# Patient Record
Sex: FEMALE | Race: WHITE
[De-identification: names, ages, dates, MRNs, and addresses within clinical notes are randomized per-mention and may not be internally consistent; named-entity substitution may affect disease eponyms.]

---

## 2017-09-27 ENCOUNTER — HOSPITAL ENCOUNTER (EMERGENCY)
Dept: HOSPITAL 25 - UCCORT | Age: 22
Discharge: HOME | End: 2017-09-27
Payer: COMMERCIAL

## 2017-09-27 VITALS — SYSTOLIC BLOOD PRESSURE: 127 MMHG | DIASTOLIC BLOOD PRESSURE: 75 MMHG

## 2017-09-27 DIAGNOSIS — E66.9: ICD-10-CM

## 2017-09-27 DIAGNOSIS — J02.9: Primary | ICD-10-CM

## 2017-09-27 PROCEDURE — 87651 STREP A DNA AMP PROBE: CPT

## 2017-09-27 PROCEDURE — G0463 HOSPITAL OUTPT CLINIC VISIT: HCPCS

## 2017-09-27 PROCEDURE — 99211 OFF/OP EST MAY X REQ PHY/QHP: CPT

## 2017-09-27 NOTE — UC
Throat Pain/Nasal Trae HPI





- HPI Summary


HPI Summary: 





sore throat since yesterday. 





- History of Current Complaint


Chief Complaint: UCRespiratory


Stated Complaint: SORE THROAT EARS


Time Seen by Provider: 09/27/17 09:01


Hx Obtained From: Patient


Hx Last Menstrual Period: 9/25/17


Pregnant?: No


Onset/Duration: Gradual Onset, Lasting Hours


Severity: Moderate


Cough: None


Associated Signs & Symptoms: Positive: Dysphagia.  Negative: FB Sensation, 

Drooling, Wheezing, Hoarseness, Sinus Discomfort, Nasal Discharge, Fever, 

Vomiting, Rash





- Epiglottits Risk Factors


Epiglottis Risk Factors: Negative





- Allergies/Home Medications


Allergies/Adverse Reactions: 


 Allergies











Allergy/AdvReac Type Severity Reaction Status Date / Time


 


No Known Allergies Allergy   Verified 09/27/17 08:49











Home Medications: 


 Home Medications





Aspirin-Acetaminophen-Caffeine [Excedrin Migraine] 1 tab PO ONCE PRN 09/27/17 [

History Confirmed 09/27/17]


Phenol 1.4% Spray* [Chloroseptic Throat Spray*] 1 spray MT Q2H PRN 09/27/17 [

History Confirmed 09/27/17]











PMH/Surg Hx/FS Hx/Imm Hx


Previously Healthy: Yes





- Surgical History


Surgical History: None


Surgery Procedure, Year, and Place: denies





- Family History


Known Family History: Positive: None





- Social History


Alcohol Use: Weekly


Substance Use Type: None


Smoking Status (MU): Never Smoked Tobacco





Review of Systems


ENT: Sore Throat


All Other Systems Reviewed And Are Negative: Yes





Physical Exam


Triage Information Reviewed: Yes


Appearance: Well-Appearing, Well-Nourished, Obese


Vital Signs: 


 Initial Vital Signs











Temp  98.3 F   09/27/17 08:53


 


Pulse  116   09/27/17 08:53


 


Resp  18   09/27/17 08:53


 


BP  127/75   09/27/17 08:53


 


Pulse Ox  99   09/27/17 08:53











Vital Signs Reviewed: Yes


Eyes: Positive: Conjunctiva Clear


ENT: Positive: Pharyngeal erythema, Nasal drainage, TMs normal.  Negative: 

Nasal congestion, Tonsillar swelling, Tonsillar exudate, Trismus


Neck exam: Normal


Neck: Positive: Supple, Nontender, Enlarged Nodes @ - left submandibular 

swelling without redness or tenderness.


Respiratory: Positive: Lungs clear, Normal breath sounds, No respiratory 

distress, No accessory muscle use.  Negative: Respiratory distress, Crackles, 

Rhonchi, Stridor, Wheezing, Expiration, Inspiration


Cardiovascular Exam: Normal


Cardiovascular: Positive: RRR, No Murmur, Pulses Normal, Brisk Capillary Refill


Abdomen Description: Positive: Nontender, No Organomegaly, Soft


Musculoskeletal: Positive: Strength Intact, ROM Intact, No Edema


Neurological: Positive: Alert, Muscle Tone Normal.  Negative: Fatigued


Skin: Negative: rashes





Throat Pain/Nasal Course/Dx





- Course


Course Of Treatment: no exudate, no fever. strep neg.





- Differential Dx/Diagnosis


Provider Diagnoses: viral pharyngitis.





Discharge





- Discharge Plan


Condition: Good


Disposition: HOME


Patient Education Materials:  Upper Respiratory Infection (ED), Pharyngitis (ED)


Referrals: 


Non Staff,Doctor [Primary Care Provider] -

## 2017-11-20 ENCOUNTER — RX RENEWAL (OUTPATIENT)
Age: 22
End: 2017-11-20

## 2017-12-26 ENCOUNTER — APPOINTMENT (OUTPATIENT)
Dept: OBGYN | Facility: CLINIC | Age: 22
End: 2017-12-26
Payer: COMMERCIAL

## 2017-12-26 VITALS
DIASTOLIC BLOOD PRESSURE: 79 MMHG | BODY MASS INDEX: 30.63 KG/M2 | SYSTOLIC BLOOD PRESSURE: 109 MMHG | HEIGHT: 60 IN | WEIGHT: 156 LBS

## 2017-12-26 DIAGNOSIS — Z01.419 ENCOUNTER FOR GYNECOLOGICAL EXAMINATION (GENERAL) (ROUTINE) W/OUT ABNORMAL FINDINGS: ICD-10-CM

## 2017-12-26 DIAGNOSIS — L70.9 ACNE, UNSPECIFIED: ICD-10-CM

## 2017-12-26 DIAGNOSIS — Z30.41 ENCOUNTER FOR SURVEILLANCE OF CONTRACEPTIVE PILLS: ICD-10-CM

## 2017-12-26 PROCEDURE — 99395 PREV VISIT EST AGE 18-39: CPT

## 2017-12-28 LAB
C TRACH RRNA SPEC QL NAA+PROBE: NOT DETECTED
N GONORRHOEA RRNA SPEC QL NAA+PROBE: NOT DETECTED
SOURCE TP AMPLIFICATION: NORMAL

## 2017-12-29 LAB — CYTOLOGY CVX/VAG DOC THIN PREP: NORMAL

## 2018-08-11 ENCOUNTER — HOSPITAL ENCOUNTER (EMERGENCY)
Dept: HOSPITAL 25 - UCCORT | Age: 23
Discharge: HOME | End: 2018-08-11
Payer: COMMERCIAL

## 2018-08-11 VITALS — DIASTOLIC BLOOD PRESSURE: 84 MMHG | SYSTOLIC BLOOD PRESSURE: 136 MMHG

## 2018-08-11 DIAGNOSIS — S80.862A: Primary | ICD-10-CM
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
## 2018-08-11 DIAGNOSIS — S80.861A: ICD-10-CM
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
## 2018-08-11 PROCEDURE — G0463 HOSPITAL OUTPT CLINIC VISIT: HCPCS

## 2018-08-11 PROCEDURE — 99212 OFFICE O/P EST SF 10 MIN: CPT

## 2018-08-11 NOTE — UC
Skin Complaint HPI





- HPI Summary


HPI Summary: 





pt notes several bites to her lower legs and feet over the past 2 weeks. her 

office at work is carpeted. she did find a tiny black insect on her but it 

sprung away when she went to show a coworker. no one at home has the rash. it 

is severely itchy. she is bombing the office today.





- History of Current Complaint


Chief Complaint: UCSkin


Time Seen by Provider: 08/11/18 13:45


Stated Complaint: SKIN COMPLAINT


Hx Obtained From: Patient


Hx Last Menstrual Period: 7/30/18


Onset/Duration: Gradual Onset


Timing: Constant


Pain Intensity: 0


Alleviating Factor(s): Nothing





- Allergy/Home Medications


Allergies/Adverse Reactions: 


 Allergies











Allergy/AdvReac Type Severity Reaction Status Date / Time


 


No Known Allergies Allergy   Verified 08/11/18 13:25














Review of Systems


Constitutional: Negative


Skin: Rash


Eyes: Negative


ENT: Negative


Respiratory: Negative


Cardiovascular: Negative


Gastrointestinal: Negative


Genitourinary: Negative


Motor: Negative


Neurovascular: Negative


Musculoskeletal: Negative


Neurological: Negative


Psychological: Negative


Is Patient Immunocompromised?: No


All Other Systems Reviewed And Are Negative: Yes





PMH/Surg Hx/FS Hx/Imm Hx


Previously Healthy: Yes





- Surgical History


Surgical History: None


Surgery Procedure, Year, and Place: denies





- Family History


Known Family History: Positive: None





- Social History


Occupation: Employed Full-time


Lives: With Family


Alcohol Use: Occasionally


Substance Use Type: None


Smoking Status (MU): Never Smoked Tobacco





- Immunization History


Vaccination Up to Date: Yes





Physical Exam


Triage Information Reviewed: Yes


Appearance: Well-Appearing


Vital Signs: 


 Initial Vital Signs











Temp  99.0 F   08/11/18 13:22


 


Pulse  96   08/11/18 13:22


 


Resp  15   08/11/18 13:22


 


BP  136/84   08/11/18 13:22


 


Pulse Ox  100   08/11/18 13:22











Vital Signs Reviewed: Yes


Eyes: Positive: Conjunctiva Clear


ENT: Positive: Normal ENT inspection


Neck: Positive: Supple, Nontender, No Lymphadenopathy


Respiratory: Positive: Lungs clear, Normal breath sounds


Cardiovascular: Positive: RRR, No Murmur


Abdomen Description: Positive: Nontender, No Organomegaly, Soft


Bowel Sounds: Positive: Present


Musculoskeletal: Positive: ROM Intact


Neurological: Positive: Alert


Psychological: Positive: Age Appropriate Behavior


Skin Exam: Normal, Other - multiple bites to BLE's below the knees with 

excoriations.





Course/Dx





- Course


Course Of Treatment: no concern for secondary infection. since may bites and 

intense itching, will tx medrol dose nirav.





- Diagnoses


Provider Diagnoses: Insect bites BLE's.





Discharge





- Sign-Out/Discharge


Documenting (check all that apply): Patient Departure





- Discharge Plan


Condition: Critical


Disposition: HOME


Prescriptions: 


methylPREDNISolone [Medrol Dosepak 4 MG*] 0 mg PO .SEE NIRAV INSTRUCTION #1 tab


Patient Education Materials:  Insect Bite or Sting (ED)


Referrals: 


SANFORD Mak [Medical Doctor] - 





- Billing Disposition and Condition


Condition: CRITICAL


Disposition: Home

## 2018-12-13 ENCOUNTER — MEDICATION RENEWAL (OUTPATIENT)
Age: 23
End: 2018-12-13

## 2019-01-10 ENCOUNTER — RX RENEWAL (OUTPATIENT)
Age: 24
End: 2019-01-10

## 2019-01-14 ENCOUNTER — MEDICATION RENEWAL (OUTPATIENT)
Age: 24
End: 2019-01-14

## 2019-02-18 ENCOUNTER — RX RENEWAL (OUTPATIENT)
Age: 24
End: 2019-02-18

## 2019-02-19 ENCOUNTER — RX RENEWAL (OUTPATIENT)
Age: 24
End: 2019-02-19

## 2019-02-22 ENCOUNTER — MED ADMIN CHARGE (OUTPATIENT)
Age: 24
End: 2019-02-22

## 2019-03-09 ENCOUNTER — HOSPITAL ENCOUNTER (EMERGENCY)
Dept: HOSPITAL 25 - UCCORT | Age: 24
Discharge: HOME | End: 2019-03-09
Payer: COMMERCIAL

## 2019-03-09 VITALS — DIASTOLIC BLOOD PRESSURE: 75 MMHG | SYSTOLIC BLOOD PRESSURE: 118 MMHG

## 2019-03-09 DIAGNOSIS — J10.1: Primary | ICD-10-CM

## 2019-03-09 LAB — FLUAV RNA SPEC QL NAA+PROBE: POSITIVE

## 2019-03-09 PROCEDURE — 99212 OFFICE O/P EST SF 10 MIN: CPT

## 2019-03-09 PROCEDURE — G0463 HOSPITAL OUTPT CLINIC VISIT: HCPCS

## 2019-03-09 PROCEDURE — 87651 STREP A DNA AMP PROBE: CPT

## 2019-03-09 NOTE — UC
FLU HPI





- HPI Summary


HPI Summary: 


23-year-old female presents with 2 day history of chills, fatigue, general 

malaise, body aches, nasal congestion, clear nasal drainage, sore throat, and 

occasional dry nonproductive cough.  Denies ear pain, dysphagia, chest pain, 

shortness of breath, abdominal pain, nausea, vomiting, or diarrhea.  Denies flu 

shot this year.








- History of Current Complaint


Stated Complaint: SINUSES


Time Seen by Provider: 03/09/19 12:53


Hx Obtained From: Patient


Hx Last Menstrual Period: 7/30/18





- Allergy/Home Medications


Allergies/Adverse Reactions: 


 Allergies











Allergy/AdvReac Type Severity Reaction Status Date / Time


 


No Known Allergies Allergy   Verified 03/09/19 12:56











Home Medications: 


 Home Medications





D-Methorphan/PE/Acetaminophen [Vicks Dayquil Cold & Flu] 1 cap PO PRN 03/09/19 [

History]


Etonogest/Eth.estradiol (Nf) [Nuvaring Vaginal Ring]  03/09/19 [History 

Confirmed 03/09/19]











PMH/Surg Hx/FS Hx/Imm Hx


Previously Healthy: Yes - Denies significant PMH





- Surgical History


Surgical History: None


Surgery Procedure, Year, and Place: denies





- Family History


Known Family History: Positive: Non-Contributory





- Social History


Occupation: Employed Full-time


Lives: Dormitory/Roommates


Alcohol Use: Occasionally


Substance Use Type: None


Smoking Status (MU): Never Smoked Tobacco





- Immunization History


Vaccination Up to Date: Yes





Review of Systems


All Other Systems Reviewed And Are Negative: Yes


Constitutional: Positive: Chills, Fatigue.  Negative: Fever


Skin: Negative: Rash


Eyes: Negative: Drainage, Eye Redness


ENT: Positive: Sore Throat, Nasal Discharge, Sinus Congestion.  Negative: Ear 

Ache, Sinus Pain/Tenderness


Respiratory: Positive: Cough.  Negative: Shortness Of Breath


Cardiovascular: Negative: Palpitations, Chest Pain


Gastrointestinal: Negative: Abdominal Pain, Vomiting, Diarrhea, Nausea


Genitourinary: Positive: Negative


Musculoskeletal: Positive: Myalgia


Neurological: Positive: Headache


Is Patient Immunocompromised?: No





Physical Exam





- Summary


Physical Exam Summary: 


GENERAL APPEARANCE: Well developed, well nourished, alert and cooperative, and 

appears to be in no acute distress.





EYES: Conjunctiva clear. No drainage. Vision is grossly intact.





EARS: External auditory canals and tympanic membranes clear, hearing grossly 

intact.





NOSE: Mild-moderate nasal congestion. Clear nasal discharge.





THROAT: Mild pharyngeal erythema. No tonsilar inflammation, swelling, exudate, 

or lesions. Uvula midline. Oral cavity normal. Teeth and gingiva in good 

general condition.





NECK: Neck supple, non-tender without lymphadenopathy.





CARDIAC: Normal S1 and S2. No S3, S4 or murmurs. Rhythm is regular. There is no 

peripheral edema, cyanosis or pallor. Extremities are warm and well perfused. 

Capillary refill is less than 2 seconds. Peripheral pulses intact.





LUNGS: Clear to auscultation without rales, rhonchi, wheezing or diminished 

breath sounds. Dry cough.





ABDOMEN: Positive bowel sounds. Soft, nondistended, nontender. No guarding or 

rebound. No masses or hepatosplenomegally.





MUSKULOSKELETAL: ROM intact to all extremities. No joint erythema or 

tenderness. Normal muscular development. Normal gait.





SKIN: Skin normal color, texture and turgor with no lesions or eruptions.





Triage Information Reviewed: Yes


Vital Signs Reviewed: Yes





Flu Course/Dx





- Course


Course Of Treatment: 23-year-old female presents with 2 day history of chills, 

fatigue, general malaise, body aches, nasal congestion, clear nasal drainage, 

sore throat, and occasional dry nonproductive cough.  Denies ear pain, dysphagia

, chest pain, shortness of breath, abdominal pain, nausea, vomiting, or 

diarrhea.  Denies flu shot this year.  Afebrile.  Vital signs stable.  Exam 

reveals a young adult female in no acute distress with mild-to-moderate nasal 

congestion, clear nasal discharge, mild pharyngeal erythema without tonsillar 

swelling or exudate, no cervical lymphadenopathy, clear bilateral breath sounds

, a dry nonproductive cough, and otherwise unremarkable exam.  Rapid flu test 

positive influenza A.  Discussed risks and benefits of starting Tamiflu with 

the patient and she has elected to start at this time.  I'm also recommending 

symptomatic treatment.  She is to return here or follow up with primary care in 

7 days if symptoms do not improve.  Anticipatory guidance and warning symptoms 

reviewed with the patient.  Verbalizes understanding and agrees with plan of 

care.





- Differential Dx/Diagnosis


Differential Diagnosis/HQI/PQRI: Bronchitis, Influenza, Upper Respiratory 

Infection


Provider Diagnosis: 


 Influenza A








Discharge





- Sign-Out/Discharge


Documenting (check all that apply): Patient Departure


All imaging exams completed and their final reports reviewed: No Studies





- Discharge Plan


Condition: Stable


Disposition: HOME


Prescriptions: 


Oseltamivir CAP* [Tamiflu CAP*] 75 mg PO BID #10 cap


Patient Education Materials:  Influenza (ED)


Forms:  *Work Release


Referrals: 


No Primary Care Phys,NOPCP [Primary Care Provider] - 


Additional Instructions: 


Your flu test in the clinic today was positive for influenza A.





Start Tamiflu 1 capsule twice a day for 5 days.





Get plenty of rest.





Drink plenty of fluids to avoid dehydration especially if you are running any 

fever.





Take over the counter acetaminophen (Tylenol) or ibuprofen (Advil, Motrin) 

according to directions as needed for pain or fever.





Use an over the counter decongestant such as Sudafed according to directions as 

needed for congestion.





Use salt water gargles several times a day if you have a sore throat.





You may also use Chloraseptic spray or Cepacol lonzenges according to 

directions which contain a numbing medication and can provide some temporary 

relief from your sore throat.





Return here or follow up with your primary care provider in 7 days if symptoms 

persist.





Seek immediate medical attention in the emergency room if you have fever 

greater than 100.5 F despite taking acetaminophen or ibuprofen, have chest pain

, difficulty breathing, are unable to swallow, or have any worsening of 

symptoms.





- Billing Disposition and Condition


Condition: STABLE


Disposition: Home

## 2019-03-20 ENCOUNTER — HOSPITAL ENCOUNTER (EMERGENCY)
Dept: HOSPITAL 25 - UCCORT | Age: 24
Discharge: HOME | End: 2019-03-20
Payer: COMMERCIAL

## 2019-03-20 VITALS — DIASTOLIC BLOOD PRESSURE: 81 MMHG | SYSTOLIC BLOOD PRESSURE: 124 MMHG

## 2019-03-20 DIAGNOSIS — H10.9: Primary | ICD-10-CM

## 2019-03-20 PROCEDURE — G0463 HOSPITAL OUTPT CLINIC VISIT: HCPCS

## 2019-03-20 PROCEDURE — 99212 OFFICE O/P EST SF 10 MIN: CPT

## 2019-03-20 NOTE — UC
Eye Complaint HPI





- HPI Summary


HPI Summary: 





Pt c/o sudden onset of right eye redness, yellow/green discharge that began 

last night.  





- History of Current Complaint


Chief Complaint: UCEye


Stated Complaint: PINK EYE


Time Seen by Provider: 03/20/19 10:06


Hx Obtained From: Patient


Hx Last Menstrual Period: 02/07/19


Pregnant?: No


Onset/Duration: Sudden Onset, Still Present


Timing: Constant


Severity Initially: Mild


Severity Currently: Mild


Pain Intensity: 0


Character: Dull


Aggravating Factor(s): Nothing


Alleviating Factor(s): Nothing


Associated Signs And Symptoms: Positive: Drainage (Purulent)





- Risk Factors


Penetrating Injury Risk Factor: Negative


Acute Glaucoma Risk Factors: Negative


Optic Artery Occlusion Risk Factors: Negative





- Allergies/Home Medications


Allergies/Adverse Reactions: 


 Allergies











Allergy/AdvReac Type Severity Reaction Status Date / Time


 


No Known Allergies Allergy   Verified 03/20/19 09:23














PMH/Surg Hx/FS Hx/Imm Hx


Previously Healthy: Yes





- Surgical History


Surgical History: None


Surgery Procedure, Year, and Place: denies





- Family History


Known Family History: Positive: Non-Contributory





- Social History


Occupation: Employed Full-time


Lives: With Family


Alcohol Use: Occasionally


Substance Use Type: None


Smoking Status (MU): Never Smoked Tobacco


Have You Smoked in the Last Year: No





- Immunization History


Vaccination Up to Date: Yes





Review of Systems


All Other Systems Reviewed And Are Negative: Yes


Constitutional: Positive: Negative


Skin: Positive: Negative


Eyes: Positive: Drainage, Eye Redness


ENT: Positive: Negative


Respiratory: Positive: Negative


Cardiovascular: Positive: Negative


Gastrointestinal: Positive: Negative


Genitourinary: Positive: Negative


Motor: Positive: Negative


Neurovascular: Positive: Negative


Musculoskeletal: Positive: Negative


Neurological: Positive: Negative


Psychological: Positive: Negative


Is Patient Immunocompromised?: No





Physical Exam


Triage Information Reviewed: Yes


Appearance: Well-Appearing


Vital Signs: 


 Initial Vital Signs











Temp  97.6 F   03/20/19 09:23


 


Pulse  96   03/20/19 09:23


 


Resp  15   03/20/19 09:23


 


BP  124/81   03/20/19 09:23


 


Pulse Ox  99   03/20/19 09:23











Vital Signs Reviewed: Yes


Eyes: Positive: Conjunctiva Inflamed, Discharge


ENT Exam: Normal


Dental Exam: Normal


Neck exam: Normal


Respiratory Exam: Normal


Cardiovascular Exam: Normal


Musculoskeletal Exam: Normal


Neurological Exam: Normal


Psychological Exam: Normal


Skin Exam: Normal





Eye Complaint Course/Dx





- Differential Dx/Diagnosis


Differential Diagnosis/HQI/PQRI: Conjunctivitis, Corneal Abrasion


Provider Diagnosis: 


 Conjunctivitis, right eye








Discharge





- Sign-Out/Discharge


Documenting (check all that apply): Patient Departure


All imaging exams completed and their final reports reviewed: No Studies





- Discharge Plan


Condition: Stable


Disposition: HOME


Prescriptions: 


Ofloxacin 0.3% (Eye Drop) [Ocuflox OPTH 0.3% (Eye Drop)] 2 drop RIGHT EYE Q8H 7 

Days #1 btl


Patient Education Materials:  Conjunctivitis (ED)


Referrals: 


No Primary Care Phys,NOPCP [Primary Care Provider] - 


[facility] [Outside] - If Needed





- Billing Disposition and Condition


Condition: STABLE


Disposition: Home

## 2019-03-27 ENCOUNTER — RX RENEWAL (OUTPATIENT)
Age: 24
End: 2019-03-27

## 2019-03-28 ENCOUNTER — MEDICATION RENEWAL (OUTPATIENT)
Age: 24
End: 2019-03-28

## 2019-12-11 ENCOUNTER — HOSPITAL ENCOUNTER (EMERGENCY)
Dept: HOSPITAL 25 - UCCORT | Age: 24
Discharge: HOME | End: 2019-12-11
Payer: COMMERCIAL

## 2019-12-11 VITALS — DIASTOLIC BLOOD PRESSURE: 82 MMHG | SYSTOLIC BLOOD PRESSURE: 122 MMHG

## 2019-12-11 DIAGNOSIS — J02.9: Primary | ICD-10-CM

## 2019-12-11 PROCEDURE — 87070 CULTURE OTHR SPECIMN AEROBIC: CPT

## 2019-12-11 PROCEDURE — 99211 OFF/OP EST MAY X REQ PHY/QHP: CPT

## 2019-12-11 PROCEDURE — G0463 HOSPITAL OUTPT CLINIC VISIT: HCPCS

## 2019-12-11 PROCEDURE — 87651 STREP A DNA AMP PROBE: CPT

## 2019-12-11 NOTE — UC
Throat Pain/Nasal Trae HPI





- HPI Summary


HPI Summary: 


Patient is a 23yo female presenting with c/o fever, sore throat, intermittent R 

ear pain and HA x2 days. Patient states it "hurts to swallow." She thought she 

was improving this morning but states pain worsened as the day went on. Notes 

fever of up to 101 but states she has kept it down by taking dayquil. Denies 

nasal congestion and cough. Notes fatigue. 








- History of Current Complaint


Chief Complaint: UCGeneralIllness


Stated Complaint: FEVER/SORE THROAT/RIGHT EAR PAIN


Hx Obtained From: Patient


Hx Last Menstrual Period: nuvaring


Pain Intensity: 5





- Allergies/Home Medications


Allergies/Adverse Reactions: 


 Allergies











Allergy/AdvReac Type Severity Reaction Status Date / Time


 


No Known Allergies Allergy   Verified 12/11/19 19:46














PMH/Surg Hx/FS Hx/Imm Hx


Previously Healthy: Yes





- Surgical History


Surgical History: None


Surgery Procedure, Year, and Place: denies





- Family History


Known Family History: Positive: Non-Contributory





- Social History


Alcohol Use: Occasionally


Substance Use Type: None


Smoking Status (MU): Never Smoked Tobacco


Have You Smoked in the Last Year: No





- Immunization History


Vaccination Up to Date: Yes





Review of Systems


All Other Systems Reviewed And Are Negative: Yes


Constitutional: Positive: Fever, Chills, Fatigue


ENT: Positive: Ear Ache - intermittent right


Cardiovascular: Positive: Negative


Gastrointestinal: Positive: Negative


Musculoskeletal: Positive: Negative


Neurological: Positive: Headache





Physical Exam


Triage Information Reviewed: Yes


Appearance: Well-Appearing, No Pain Distress, Well-Nourished


Vital Signs: 


 Initial Vital Signs











Temp  97.1 F   12/11/19 19:43


 


Pulse  130   12/11/19 19:43


 


Resp  19   12/11/19 19:43


 


BP  122/82   12/11/19 19:43


 


Pulse Ox  98   12/11/19 19:43











Vital Signs Reviewed: Yes


Eyes: Positive: Conjunctiva Clear


ENT: Positive: Hearing grossly normal, Pharyngeal erythema, TMs normal - TMs 

intact with normal light reflex and landmarks b/l, Tonsillar swelling, 

Tonsillar exudate - right sided, Uvula midline


Neck: Positive: Supple, Nontender, Enlarged Nodes @ - tonsillar


Respiratory Exam: Normal


Respiratory: Positive: Lungs clear, Normal breath sounds, No respiratory 

distress


Cardiovascular Exam: Normal


Cardiovascular: Positive: RRR


Neurological: Positive: Alert


Psychological: Positive: Age Appropriate Behavior


Skin Exam: Normal





Throat Pain/Nasal Course/Dx





- Course


Course Of Treatment: 


Negative rapid strep test. Throat swab sent for culture. Informed patient that 

she would be notified with any results warranting treatment. Instructed to 

continue symptomatic treatment and follow up if no resolution in 7 days. 

Patient voiced understanding and agreed with treatment plan.








- Differential Dx/Diagnosis


Differential Diagnosis/HQI/PQRI: Pharyngitis, Tonsillitis, URI


Provider Diagnosis: 


 Exudative pharyngitis








Discharge ED





- Sign-Out/Discharge


Documenting (check all that apply): Patient Departure


All imaging exams completed and their final reports reviewed: No Studies





- Discharge Plan


Condition: Stable


Disposition: HOME


Patient Education Materials:  Pharyngitis (ED)


Referrals: 


[facility] [Outside] - If Needed


[facility] PHYSICIAN REFERRAL [Outside] - If Needed


Additional Instructions: 


As discussed, you tested negative for strep throat today. A culture has been 

sent and you will be notified only if the results warrant treatment.


You may take continue to take dayquil and/or nyquil as directed for fever and 

pain relief.


You may use over the counter throat sprays, lozenges, and tea with honey for 

symptomatic relief.


Get plenty of rest and increase your fluid intake.


Follow up with your primary care provider or one of the referrals listed below 

if your symptoms do not resolve within 7 days.


Return or go to the emergency room with any new or worsening symptoms, 

including fever higher than 102.








- Billing Disposition and Condition


Condition: STABLE


Disposition: Home